# Patient Record
Sex: MALE | Race: WHITE | Employment: FULL TIME | URBAN - METROPOLITAN AREA
[De-identification: names, ages, dates, MRNs, and addresses within clinical notes are randomized per-mention and may not be internally consistent; named-entity substitution may affect disease eponyms.]

---

## 2022-05-23 VITALS
DIASTOLIC BLOOD PRESSURE: 73 MMHG | HEIGHT: 70 IN | BODY MASS INDEX: 20.04 KG/M2 | RESPIRATION RATE: 16 BRPM | WEIGHT: 140 LBS | SYSTOLIC BLOOD PRESSURE: 103 MMHG | HEART RATE: 69 BPM | OXYGEN SATURATION: 99 % | TEMPERATURE: 97 F

## 2022-05-23 PROCEDURE — 99284 EMERGENCY DEPT VISIT MOD MDM: CPT

## 2022-05-23 RX ORDER — TETANUS AND DIPHTHERIA TOXOIDS ADSORBED 2; 2 [LF]/.5ML; [LF]/.5ML
0.5 INJECTION INTRAMUSCULAR ONCE
Status: COMPLETED | OUTPATIENT
Start: 2022-05-23 | End: 2022-05-24

## 2022-05-23 RX ORDER — AMOXICILLIN AND CLAVULANATE POTASSIUM 875; 125 MG/1; MG/1
1 TABLET, FILM COATED ORAL ONCE
Status: COMPLETED | OUTPATIENT
Start: 2022-05-23 | End: 2022-05-24

## 2022-05-24 ENCOUNTER — HOSPITAL ENCOUNTER (EMERGENCY)
Age: 27
Discharge: HOME | End: 2022-05-24
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
## 2022-05-24 DIAGNOSIS — S61.259A DOG BITE OF FINGER, INITIAL ENCOUNTER: Primary | ICD-10-CM

## 2022-05-24 PROCEDURE — 90471 IMMUNIZATION ADMIN: CPT | Performed by: PHYSICIAN ASSISTANT

## 2022-05-24 PROCEDURE — 6370000000 HC RX 637 (ALT 250 FOR IP): Performed by: PHYSICIAN ASSISTANT

## 2022-05-24 PROCEDURE — 99284 EMERGENCY DEPT VISIT MOD MDM: CPT

## 2022-05-24 PROCEDURE — 6360000002 HC RX W HCPCS: Performed by: PHYSICIAN ASSISTANT

## 2022-05-24 PROCEDURE — 90714 TD VACC NO PRESV 7 YRS+ IM: CPT | Performed by: PHYSICIAN ASSISTANT

## 2022-05-24 RX ORDER — AMOXICILLIN AND CLAVULANATE POTASSIUM 875; 125 MG/1; MG/1
1 TABLET, FILM COATED ORAL 2 TIMES DAILY
Qty: 9 TABLET | Refills: 0 | Status: SHIPPED | OUTPATIENT
Start: 2022-05-24 | End: 2022-05-29

## 2022-05-24 RX ADMIN — TETANUS AND DIPHTHERIA TOXOIDS ADSORBED 0.5 ML: 2; 2 INJECTION INTRAMUSCULAR at 00:08

## 2022-05-24 RX ADMIN — AMOXICILLIN AND CLAVULANATE POTASSIUM 1 TABLET: 875; 125 TABLET, FILM COATED ORAL at 00:09

## 2022-05-24 NOTE — ED PROVIDER NOTES
[facility]  Department of Emergency Medicine   ED  Encounter Note  Admit Date/RoomTime: 2022 12:00 AM  ED Room: KEYA/KEYA    NAME: Georig Luu  : 1995  MRN: [id number]     Chief Complaint:  Animal Bite (bit by dog left pinky finger, dog known, unsure last tetanus shot )    History of Present Illness        Georgi Luu is a 32 y.o. old male presenting to the emergency department by private vehicle, for a dog bite to left small finger, with 2 small lacerations which occured 1 day(s) prior to arrival.  Since onset the symptoms have been persistent. Patient reports his dog and his girlfriend's dog got into a little fight in his girlfriend's dog which is a puggle, finger. Patient reports he has been using Neosporin and peroxide on the wounds. Tetanus Status:  more than 10 years ago. T             Abnormal Behavior Witnessed:  No.           Geographic Location Where Bitten:  at home            Immunization Status of Animal:  up to date    ROS   Pertinent positives and negatives are stated within HPI, all other systems reviewed and are negative. Past Medical History:  has no past medical history on file. Surgical History:  has no past surgical history on file. Social History:      Family History: family history is not on file. Allergies: Patient has no known allergies. Physical Exam   Oxygen Saturation Interpretation: Normal.        ED Triage Vitals [22 2234]   BP Temp Temp Source Pulse Resp SpO2 Height Weight   103/73 97 °F (36.1 °C) Temporal 69 16 99 % 5' 10\" (1.778 m) 140 lb (63.5 kg)         Constitutional:  Alert, development consistent with age. Neck:  Normal ROM. Supple. Extremity(s):  Left: Little finger distal phalanx dorsal aspect. Tenderness:  mild. Swelling: Mild. Deformity: No.               ROM: full range of motion.              Skin:  a laceration located ulnar side extending off of the cuticle and a second laceration 3 mm from that on the lateral side of finger also about 2 mm long. Neurovascular: Motor deficit: none. Sensory deficit: none. Pulse deficit: none. Capillary refill: normal.  Lymphatics: No lymphangitis or adenopathy noted. Neurological:  Oriented. Motor functions intact. Lab / Imaging Results   (All laboratory and radiology results have been personally reviewed by myself)  Labs:  No results found for this visit on 05/24/22. Imaging: All Radiology results interpreted by Radiologist unless otherwise noted. No orders to display       ED Course / Medical Decision Making     Medications   diptheria-tetanus toxoids [facility]) 2-2 LF/0.5ML injection 0.5 mL (0.5 mLs IntraMUSCular Given 5/24/22 0008)   amoxicillin-clavulanate (AUGMENTIN) 875-125 MG per tablet 1 tablet (1 tablet Oral Given 5/24/22 0009)        Consult(s):   None    Procedure(s):  There were no wounds requiring formal closure. MDM:   Patient presents to emergency with a dog bite on his left little finger which was his girlfriend's dog. Dogs are up-to-date on their immunizations. Patient received a tetanus shot in department and Augmentin. Prescription for Augmentin for home. Advise follow-up with primary care for wound check or return to emergency if worsening. Plan of Care/Counseling:  Abhinav Clayton PA-C reviewed today's visit with the patient in addition to providing specific details for the plan of care and counseling regarding the diagnosis and prognosis. Questions are answered at this time and are agreeable with the plan. Assessment      1. Dog bite of finger, initial encounter      Plan   Discharged home.   Patient condition is good    New Medications     New Prescriptions    AMOXICILLIN-CLAVULANATE (AUGMENTIN) 875-125 MG PER TABLET    Take 1 tablet by mouth 2 times daily for 5 days     Electronically signed by Abhinav Clayton PA-C   DD: 5/24/22  **This report was transcribed using voice recognition software. Every effort was made to ensure accuracy; however, inadvertent computerized transcription errors may be present.   END OF ED PROVIDER NOTE       Mushtaq Osorio PA-C  05/24/22 0126

## 2022-05-24 NOTE — Clinical Note
Roge James was seen and treated in our emergency department on 5/23/2022. He may return to work on 05/25/2022. If you have any questions or concerns, please don't hesitate to call.       Kerline Martell PA-C